# Patient Record
Sex: MALE | Race: WHITE | Employment: FULL TIME | URBAN - METROPOLITAN AREA
[De-identification: names, ages, dates, MRNs, and addresses within clinical notes are randomized per-mention and may not be internally consistent; named-entity substitution may affect disease eponyms.]

---

## 2021-10-07 ENCOUNTER — HOSPITAL ENCOUNTER (EMERGENCY)
Age: 21
Discharge: HOME | End: 2021-10-07
Attending: EMERGENCY MEDICINE
Payer: COMMERCIAL

## 2021-10-07 ENCOUNTER — APPOINTMENT (OUTPATIENT)
Dept: GENERAL RADIOLOGY | Age: 21
End: 2021-10-07
Payer: COMMERCIAL

## 2021-10-07 VITALS
OXYGEN SATURATION: 100 % | HEART RATE: 92 BPM | RESPIRATION RATE: 20 BRPM | DIASTOLIC BLOOD PRESSURE: 81 MMHG | TEMPERATURE: 98.1 F | SYSTOLIC BLOOD PRESSURE: 144 MMHG

## 2021-10-07 DIAGNOSIS — S83.005A CLOSED DISLOCATION OF LEFT PATELLA, INITIAL ENCOUNTER: Primary | ICD-10-CM

## 2021-10-07 PROCEDURE — 99283 EMERGENCY DEPT VISIT LOW MDM: CPT

## 2021-10-07 PROCEDURE — 27560 TREAT KNEECAP DISLOCATION: CPT

## 2021-10-07 PROCEDURE — 73564 X-RAY EXAM KNEE 4 OR MORE: CPT

## 2021-10-07 PROCEDURE — 6370000000 HC RX 637 (ALT 250 FOR IP): Performed by: EMERGENCY MEDICINE

## 2021-10-07 RX ORDER — DIAZEPAM 5 MG/1
5 TABLET ORAL ONCE
Status: DISCONTINUED | OUTPATIENT
Start: 2021-10-07 | End: 2021-10-07

## 2021-10-07 RX ORDER — OXYCODONE HYDROCHLORIDE AND ACETAMINOPHEN 5; 325 MG/1; MG/1
1 TABLET ORAL ONCE
Status: COMPLETED | OUTPATIENT
Start: 2021-10-07 | End: 2021-10-07

## 2021-10-07 RX ADMIN — OXYCODONE HYDROCHLORIDE AND ACETAMINOPHEN 1 TABLET: 5; 325 TABLET ORAL at 22:26

## 2021-10-07 ASSESSMENT — PAIN SCALES - GENERAL
PAINLEVEL_OUTOF10: 10
PAINLEVEL_OUTOF10: 10

## 2021-10-07 ASSESSMENT — PAIN DESCRIPTION - PAIN TYPE: TYPE: ACUTE PAIN

## 2021-10-08 NOTE — ED NOTES
Bed: 04TR-04  Expected date:   Expected time:   Means of arrival:   Comments:  Medic: possible dislocation of left knee     No Cruz RN  10/07/21 0185

## 2021-10-08 NOTE — ED TRIAGE NOTES
Patient arrived in the ED via stretcher with SFD. Patients right knee externally rotated outwards. Patient reports pulling something and he started to fall. Patient denied losing consciousness and hitting head.

## 2021-10-08 NOTE — ED PROVIDER NOTES
Clubs or Organizations:     Attends Club or Organization Meetings:     Marital Status:    Intimate Partner Violence:     Fear of Current or Ex-Partner:     Emotionally Abused:     Physically Abused:     Sexually Abused:      No current facility-administered medications for this encounter. Current Outpatient Medications   Medication Sig Dispense Refill    omeprazole (PRILOSEC) 20 MG capsule Take 20 mg by mouth daily      famotidine (PEPCID) 40 MG tablet Take 1 tablet by mouth daily. One  Tab po  Bid  For  5  Days  Then one  Tab po  daily 40 tablet 0    oxybutynin (DITROPAN-XL) 10 MG CR tablet Take 10 mg by mouth daily. No Known Allergies    Nursing Notes Reviewed    Physical Exam:  ED Triage Vitals   Enc Vitals Group      BP 10/07/21 2219 (!) 144/81      Pulse 10/07/21 2219 92      Resp 10/07/21 2219 20      Temp 10/07/21 2219 98.1 °F (36.7 °C)      Temp Source 10/07/21 2219 Oral      SpO2 10/07/21 2220 100 %      Weight --       Height --       Head Circumference --       Peak Flow --       Pain Score --       Pain Loc --       Pain Edu? --       Excl. in [address]? --      GENERAL APPEARANCE: Awake and alert. Cooperative. No acute distress. EXTREMITIES: LLE: Knee deformity with lateral dislocation of patella. No range of motion at knee secondary to pain. 2+ DP pulse. Sensory distribution of sural/saphenous/tibial/peroneal nerves intact  SKIN: Warm and dry. I have reviewed and interpreted all of the currently available lab results from this visit (if applicable):  No results found for this visit on 10/07/21. Radiographs (if obtained):  [] The following radiograph was interpreted by myself in the absence of a radiologist:  [x] Radiologist's Report Reviewed:      EKG (if obtained): (All EKG's are interpreted by myself in the absence of a cardiologist)    MDM:  Plan of care is discussed thoroughly with the patient and family if present.   If performed, all imaging and lab work also discussed with patient. All relevant prior results and chart reviewed if available. Patient presents as above. He is in no acute distress. Vital signs are normal.  He is neurovascularly intact. He has isolated left knee injury and clinically has lateral patellar dislocation. After reduction which the patient tolerated well, there is no evidence of patellar or quad rupture. Neurovascular status is unchanged. Ace wrap is placed and he is given crutches for assistance with weightbearing and given orthopedic surgery follow-up. Discharged in good condition. Procedure Note - Joint Reduction: The benefits, risks, and alternatives of patella reduction were discussed with the patient. Questions were sought and answered. Verbal consent was given for the procedure. Hieu Gee was given Percocet prior to procedure. The dislocation was reduced to the best of my abilities utilizing medially directed pressure along with knee extension. Following reduction, compression wrap was placed and the extremity's neurovascular status was re-checked and was unchanged from the pre-procedure exam. The patient tolerated the procedure without complications. Instructions were given to return immediately for increasing pain, new numbness or weakness, a cold/pale extremity or any other worsening or worrisome concerns. Clinical Impression:  1.  Closed dislocation of left patella, initial encounter      (Please note that portions of this note may have been completed with a voice recognition program. Efforts were made to edit the dictations but occasionally words are mis-transcribed.)    MD Alexandro Leonard MD  10/07/21 [id number]